# Patient Record
Sex: MALE | Race: BLACK OR AFRICAN AMERICAN | NOT HISPANIC OR LATINO | Employment: STUDENT | URBAN - METROPOLITAN AREA
[De-identification: names, ages, dates, MRNs, and addresses within clinical notes are randomized per-mention and may not be internally consistent; named-entity substitution may affect disease eponyms.]

---

## 2023-09-09 ENCOUNTER — APPOINTMENT (OUTPATIENT)
Dept: RADIOLOGY | Facility: MEDICAL CENTER | Age: 23
End: 2023-09-09
Attending: EMERGENCY MEDICINE
Payer: COMMERCIAL

## 2023-09-09 ENCOUNTER — HOSPITAL ENCOUNTER (EMERGENCY)
Facility: MEDICAL CENTER | Age: 23
End: 2023-09-09
Attending: EMERGENCY MEDICINE
Payer: COMMERCIAL

## 2023-09-09 VITALS
RESPIRATION RATE: 18 BRPM | WEIGHT: 195 LBS | SYSTOLIC BLOOD PRESSURE: 114 MMHG | BODY MASS INDEX: 26.41 KG/M2 | HEIGHT: 72 IN | HEART RATE: 79 BPM | TEMPERATURE: 98.3 F | DIASTOLIC BLOOD PRESSURE: 63 MMHG | OXYGEN SATURATION: 96 %

## 2023-09-09 DIAGNOSIS — S16.1XXA STRAIN OF NECK MUSCLE, INITIAL ENCOUNTER: ICD-10-CM
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
## 2023-09-09 PROCEDURE — 71045 X-RAY EXAM CHEST 1 VIEW: CPT

## 2023-09-09 PROCEDURE — 72125 CT NECK SPINE W/O DYE: CPT

## 2023-09-09 PROCEDURE — 700102 HCHG RX REV CODE 250 W/ 637 OVERRIDE(OP): Performed by: EMERGENCY MEDICINE

## 2023-09-09 PROCEDURE — 305948 HCHG GREEN TRAUMA ACT PRE-NOTIFY NO CC

## 2023-09-09 PROCEDURE — 99285 EMERGENCY DEPT VISIT HI MDM: CPT

## 2023-09-09 PROCEDURE — A9270 NON-COVERED ITEM OR SERVICE: HCPCS | Performed by: EMERGENCY MEDICINE

## 2023-09-09 RX ORDER — CYCLOBENZAPRINE HCL 10 MG
10 TABLET ORAL 3 TIMES DAILY PRN
Qty: 20 TABLET | Refills: 0 | Status: SHIPPED | OUTPATIENT
Start: 2023-09-09

## 2023-09-09 RX ORDER — ACETAMINOPHEN 500 MG
1000 TABLET ORAL ONCE
Status: COMPLETED | OUTPATIENT
Start: 2023-09-09 | End: 2023-09-09

## 2023-09-09 RX ADMIN — ACETAMINOPHEN 1000 MG: 500 TABLET ORAL at 13:39

## 2023-09-09 ASSESSMENT — PAIN DESCRIPTION - PAIN TYPE: TYPE: ACUTE PAIN

## 2023-09-09 NOTE — ED NOTES
Discharge orders received. Discharge instructions provided by ERP. AVS provided and reviewed with patient. No IV placed during ED visit. Patient AOx4 and ambulatory. Patient to be discharged once registration is complete and home medication pharmacy is selected.

## 2023-09-10 ENCOUNTER — APPOINTMENT (OUTPATIENT)
Dept: RADIOLOGY | Facility: MEDICAL CENTER | Age: 23
End: 2023-09-10
Attending: EMERGENCY MEDICINE
Payer: COMMERCIAL

## 2023-09-10 ENCOUNTER — HOSPITAL ENCOUNTER (EMERGENCY)
Facility: MEDICAL CENTER | Age: 23
End: 2023-09-10
Attending: EMERGENCY MEDICINE
Payer: COMMERCIAL

## 2023-09-10 VITALS
RESPIRATION RATE: 16 BRPM | SYSTOLIC BLOOD PRESSURE: 124 MMHG | BODY MASS INDEX: 25.86 KG/M2 | OXYGEN SATURATION: 98 % | TEMPERATURE: 98.4 F | DIASTOLIC BLOOD PRESSURE: 76 MMHG | HEART RATE: 84 BPM | WEIGHT: 190.92 LBS | HEIGHT: 72 IN
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
## 2023-09-10 DIAGNOSIS — S09.90XA CLOSED HEAD INJURY, INITIAL ENCOUNTER: ICD-10-CM

## 2023-09-10 PROCEDURE — 70450 CT HEAD/BRAIN W/O DYE: CPT

## 2023-09-10 PROCEDURE — 99284 EMERGENCY DEPT VISIT MOD MDM: CPT
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
## 2023-09-10 NOTE — DISCHARGE INSTRUCTIONS
Please refrain from bright lights, loud music, movies, studying or reading excessively until your symptoms abated.  Return to the emergency department if you have severe pain, profound vomiting or increasing symptoms. Take ibuprofen and Tylenol for pain control.
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
